# Patient Record
Sex: FEMALE | Race: BLACK OR AFRICAN AMERICAN | NOT HISPANIC OR LATINO | URBAN - METROPOLITAN AREA
[De-identification: names, ages, dates, MRNs, and addresses within clinical notes are randomized per-mention and may not be internally consistent; named-entity substitution may affect disease eponyms.]

---

## 2021-10-23 ENCOUNTER — INPATIENT (INPATIENT)
Facility: HOSPITAL | Age: 56
LOS: 1 days | Discharge: HOME | DRG: 914 | End: 2021-10-25
Attending: NEUROLOGICAL SURGERY | Admitting: NEUROLOGICAL SURGERY
Payer: COMMERCIAL

## 2021-10-23 VITALS
RESPIRATION RATE: 18 BRPM | DIASTOLIC BLOOD PRESSURE: 88 MMHG | WEIGHT: 149.91 LBS | SYSTOLIC BLOOD PRESSURE: 151 MMHG | OXYGEN SATURATION: 98 % | HEART RATE: 99 BPM | TEMPERATURE: 98 F
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
## 2021-10-23 DIAGNOSIS — I82.4Z1 ACUTE EMBOLISM AND THROMBOSIS OF UNSPECIFIED DEEP VEINS OF RIGHT DISTAL LOWER EXTREMITY: ICD-10-CM
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
## 2021-10-23 DIAGNOSIS — S09.90XA UNSPECIFIED INJURY OF HEAD, INITIAL ENCOUNTER: ICD-10-CM
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
## 2021-10-23 DIAGNOSIS — J45.909 UNSPECIFIED ASTHMA, UNCOMPLICATED: ICD-10-CM

## 2021-10-23 DIAGNOSIS — D18.02 HEMANGIOMA OF INTRACRANIAL STRUCTURES: ICD-10-CM

## 2021-10-23 DIAGNOSIS — G93.0 CEREBRAL CYSTS: ICD-10-CM

## 2021-10-23 DIAGNOSIS — S06.2X0A DIFFUSE TRAUMATIC BRAIN INJURY WITHOUT LOSS OF CONSCIOUSNESS, INITIAL ENCOUNTER: ICD-10-CM
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
## 2021-10-23 LAB
ALBUMIN SERPL ELPH-MCNC: 3.7 G/DL — SIGNIFICANT CHANGE UP (ref 3.4–5)
ALP SERPL-CCNC: 89 U/L — SIGNIFICANT CHANGE UP (ref 40–120)
ALT FLD-CCNC: 11 U/L — LOW (ref 12–42)
ANION GAP SERPL CALC-SCNC: 7 MMOL/L — LOW (ref 9–16)
APTT BLD: 26.6 SEC — LOW (ref 27.5–35.5)
AST SERPL-CCNC: 49 U/L — HIGH (ref 15–37)
BILIRUB SERPL-MCNC: 0.7 MG/DL — SIGNIFICANT CHANGE UP (ref 0.2–1.2)
BUN SERPL-MCNC: 12 MG/DL — SIGNIFICANT CHANGE UP (ref 7–23)
CALCIUM SERPL-MCNC: 9.6 MG/DL — SIGNIFICANT CHANGE UP (ref 8.5–10.5)
CHLORIDE SERPL-SCNC: 105 MMOL/L — SIGNIFICANT CHANGE UP (ref 96–108)
CO2 SERPL-SCNC: 26 MMOL/L — SIGNIFICANT CHANGE UP (ref 22–31)
CREAT SERPL-MCNC: 0.68 MG/DL — SIGNIFICANT CHANGE UP (ref 0.5–1.3)
GLUCOSE SERPL-MCNC: 107 MG/DL — HIGH (ref 70–99)
HCT VFR BLD CALC: 40.1 % — SIGNIFICANT CHANGE UP (ref 34.5–45)
HGB BLD-MCNC: 13.2 G/DL — SIGNIFICANT CHANGE UP (ref 11.5–15.5)
INR BLD: 1.05 — SIGNIFICANT CHANGE UP (ref 0.88–1.16)
MCHC RBC-ENTMCNC: 30.7 PG — SIGNIFICANT CHANGE UP (ref 27–34)
MCHC RBC-ENTMCNC: 32.9 GM/DL — SIGNIFICANT CHANGE UP (ref 32–36)
MCV RBC AUTO: 93.3 FL — SIGNIFICANT CHANGE UP (ref 80–100)
NRBC # BLD: 0 /100 WBCS — SIGNIFICANT CHANGE UP (ref 0–0)
PLATELET # BLD AUTO: 243 K/UL — SIGNIFICANT CHANGE UP (ref 150–400)
POTASSIUM SERPL-MCNC: 4.8 MMOL/L — SIGNIFICANT CHANGE UP (ref 3.5–5.3)
POTASSIUM SERPL-SCNC: 4.8 MMOL/L — SIGNIFICANT CHANGE UP (ref 3.5–5.3)
PROT SERPL-MCNC: 7.9 G/DL — SIGNIFICANT CHANGE UP (ref 6.4–8.2)
PROTHROM AB SERPL-ACNC: 12.4 SEC — SIGNIFICANT CHANGE UP (ref 10.6–13.6)
RBC # BLD: 4.3 M/UL — SIGNIFICANT CHANGE UP (ref 3.8–5.2)
RBC # FLD: 13.5 % — SIGNIFICANT CHANGE UP (ref 10.3–14.5)
SARS-COV-2 RNA SPEC QL NAA+PROBE: SIGNIFICANT CHANGE UP
SODIUM SERPL-SCNC: 138 MMOL/L — SIGNIFICANT CHANGE UP (ref 132–145)
WBC # BLD: 5.2 K/UL — SIGNIFICANT CHANGE UP (ref 3.8–10.5)
WBC # FLD AUTO: 5.2 K/UL — SIGNIFICANT CHANGE UP (ref 3.8–10.5)

## 2021-10-23 PROCEDURE — 99285 EMERGENCY DEPT VISIT HI MDM: CPT

## 2021-10-23 PROCEDURE — 70450 CT HEAD/BRAIN W/O DYE: CPT | Mod: 26,77

## 2021-10-23 PROCEDURE — 70450 CT HEAD/BRAIN W/O DYE: CPT | Mod: 26

## 2021-10-23 PROCEDURE — 72125 CT NECK SPINE W/O DYE: CPT | Mod: 26

## 2021-10-23 RX ORDER — TETANUS TOXOID, REDUCED DIPHTHERIA TOXOID AND ACELLULAR PERTUSSIS VACCINE, ADSORBED 5; 2.5; 8; 8; 2.5 [IU]/.5ML; [IU]/.5ML; UG/.5ML; UG/.5ML; UG/.5ML
0.5 SUSPENSION INTRAMUSCULAR ONCE
Refills: 0 | Status: COMPLETED | OUTPATIENT
Start: 2021-10-23 | End: 2021-10-23

## 2021-10-23 RX ORDER — ACETAMINOPHEN 500 MG
650 TABLET ORAL ONCE
Refills: 0 | Status: COMPLETED | OUTPATIENT
Start: 2021-10-23 | End: 2021-10-23

## 2021-10-23 RX ORDER — BACITRACIN ZINC 500 UNIT/G
1 OINTMENT IN PACKET (EA) TOPICAL ONCE
Refills: 0 | Status: COMPLETED | OUTPATIENT
Start: 2021-10-23 | End: 2021-10-23

## 2021-10-23 RX ADMIN — Medication 1 APPLICATION(S): at 16:31

## 2021-10-23 RX ADMIN — TETANUS TOXOID, REDUCED DIPHTHERIA TOXOID AND ACELLULAR PERTUSSIS VACCINE, ADSORBED 0.5 MILLILITER(S): 5; 2.5; 8; 8; 2.5 SUSPENSION INTRAMUSCULAR at 16:31

## 2021-10-23 RX ADMIN — Medication 650 MILLIGRAM(S): at 16:31

## 2021-10-23 NOTE — ED PROVIDER NOTE - ENMT, MLM
Airway patent.  No scalp hematomas noted, but moderate sized hematoma over mid forehead with superficial abrasion.  No periorbital ecchymosis.  No mastoid ecchymosis.

## 2021-10-23 NOTE — ED PROVIDER NOTE - NEUROLOGICAL, MLM
none
Alert and oriented, no focal deficits, no motor or sensory deficits.  CN II-XII grossly intact.

## 2021-10-23 NOTE — ED ADULT NURSE NOTE - NSIMPLEMENTINTERV_GEN_ALL_ED
Implemented All Universal Safety Interventions:  [place] to call system. Call bell, personal items and telephone within reach. Instruct patient to call for assistance. Room bathroom lighting operational. Non-slip footwear when patient is off stretcher. Physically safe environment: no spills, clutter or unnecessary equipment. Stretcher in lowest position, wheels locked, appropriate side rails in place.

## 2021-10-23 NOTE — ED PROVIDER NOTE - OBJECTIVE STATEMENT
She states she tripped on the sidewalk earlier today and fell on her face, striking her forehead on the pavement.  She denies LOC, but she has a mild headache and nausea.  No vomiting.  No neck pain.  No visual complaints.  No other injuries except possibly bumping her right knee.  She is not on blood thinners.  She lives alone and the incident was unwitnessed.  Denies any syncope or seizures.  Otherwise healthy with no significant PMHx.

## 2021-10-23 NOTE — ED ADULT NURSE NOTE - CHIEF COMPLAINT
pt here with mechanical fall from standing height- abrasion noted to forehead- Pt is a+Ox3 denies neck or back pain.

## 2021-10-23 NOTE — ED ADULT TRIAGE NOTE - CHIEF COMPLAINT QUOTE
pt here with mechanical fall from standing height- abrasion noted to forehead- Pt is a+Ox3 denies neck or back pain

## 2021-10-23 NOTE — ED PROVIDER NOTE - CLINICAL SUMMARY MEDICAL DECISION MAKING FREE TEXT BOX
Pt with isolated head injury and no LOC, not on blood thinners.  CT done, which shows two incidental findings (cystic lesions), one of which is causing mass effect, and the other which shows possible surrounding hemorrhage.  Neuro exam is WNL.  Discussed case with [facility]surgery who agreed with the need for admission, repeat 4-hr CT, and MRI evaluation.  Patient is agreeable to plan and consents to transfer.

## 2021-10-24 DIAGNOSIS — G93.9 DISORDER OF BRAIN, UNSPECIFIED: ICD-10-CM

## 2021-10-24 DIAGNOSIS — Z98.890 OTHER SPECIFIED POSTPROCEDURAL STATES: Chronic | ICD-10-CM

## 2021-10-24 DIAGNOSIS — J45.909 UNSPECIFIED ASTHMA, UNCOMPLICATED: ICD-10-CM

## 2021-10-24 DIAGNOSIS — Z01.818 ENCOUNTER FOR OTHER PREPROCEDURAL EXAMINATION: ICD-10-CM

## 2021-10-24 LAB
ALBUMIN SERPL ELPH-MCNC: 4.3 G/DL — SIGNIFICANT CHANGE UP (ref 3.3–5)
ALP SERPL-CCNC: 80 U/L — SIGNIFICANT CHANGE UP (ref 40–120)
ALT FLD-CCNC: 19 U/L — SIGNIFICANT CHANGE UP (ref 10–45)
ANION GAP SERPL CALC-SCNC: 8 MMOL/L — SIGNIFICANT CHANGE UP (ref 5–17)
AST SERPL-CCNC: 26 U/L — SIGNIFICANT CHANGE UP (ref 10–40)
BILIRUB SERPL-MCNC: 0.9 MG/DL — SIGNIFICANT CHANGE UP (ref 0.2–1.2)
BLD GP AB SCN SERPL QL: NEGATIVE — SIGNIFICANT CHANGE UP
BUN SERPL-MCNC: 9 MG/DL — SIGNIFICANT CHANGE UP (ref 7–23)
CALCIUM SERPL-MCNC: 9.3 MG/DL — SIGNIFICANT CHANGE UP (ref 8.4–10.5)
CHLORIDE SERPL-SCNC: 106 MMOL/L — SIGNIFICANT CHANGE UP (ref 96–108)
CO2 SERPL-SCNC: 28 MMOL/L — SIGNIFICANT CHANGE UP (ref 22–31)
CREAT SERPL-MCNC: 0.73 MG/DL — SIGNIFICANT CHANGE UP (ref 0.5–1.3)
GLUCOSE SERPL-MCNC: 96 MG/DL — SIGNIFICANT CHANGE UP (ref 70–99)
HCG SERPL-ACNC: 2 MIU/ML — SIGNIFICANT CHANGE UP
HCT VFR BLD CALC: 38.2 % — SIGNIFICANT CHANGE UP (ref 34.5–45)
HGB BLD-MCNC: 12.6 G/DL — SIGNIFICANT CHANGE UP (ref 11.5–15.5)
MAGNESIUM SERPL-MCNC: 2 MG/DL — SIGNIFICANT CHANGE UP (ref 1.6–2.6)
MCHC RBC-ENTMCNC: 30.4 PG — SIGNIFICANT CHANGE UP (ref 27–34)
MCHC RBC-ENTMCNC: 33 GM/DL — SIGNIFICANT CHANGE UP (ref 32–36)
MCV RBC AUTO: 92.3 FL — SIGNIFICANT CHANGE UP (ref 80–100)
NRBC # BLD: 0 /100 WBCS — SIGNIFICANT CHANGE UP (ref 0–0)
PHOSPHATE SERPL-MCNC: 3.7 MG/DL — SIGNIFICANT CHANGE UP (ref 2.5–4.5)
PLATELET # BLD AUTO: 251 K/UL — SIGNIFICANT CHANGE UP (ref 150–400)
POTASSIUM SERPL-MCNC: 3.5 MMOL/L — SIGNIFICANT CHANGE UP (ref 3.5–5.3)
POTASSIUM SERPL-SCNC: 3.5 MMOL/L — SIGNIFICANT CHANGE UP (ref 3.5–5.3)
PROT SERPL-MCNC: 7.2 G/DL — SIGNIFICANT CHANGE UP (ref 6–8.3)
RBC # BLD: 4.14 M/UL — SIGNIFICANT CHANGE UP (ref 3.8–5.2)
RBC # FLD: 13.3 % — SIGNIFICANT CHANGE UP (ref 10.3–14.5)
RH IG SCN BLD-IMP: POSITIVE — SIGNIFICANT CHANGE UP
SODIUM SERPL-SCNC: 142 MMOL/L — SIGNIFICANT CHANGE UP (ref 135–145)
WBC # BLD: 4.65 K/UL — SIGNIFICANT CHANGE UP (ref 3.8–10.5)
WBC # FLD AUTO: 4.65 K/UL — SIGNIFICANT CHANGE UP (ref 3.8–10.5)

## 2021-10-24 PROCEDURE — 99222 1ST HOSP IP/OBS MODERATE 55: CPT | Mod: GC

## 2021-10-24 PROCEDURE — 70553 MRI BRAIN STEM W/O & W/DYE: CPT | Mod: 26

## 2021-10-24 RX ORDER — ALBUTEROL 90 UG/1
2.5 AEROSOL, METERED ORAL EVERY 6 HOURS
Refills: 0 | Status: DISCONTINUED | OUTPATIENT
Start: 2021-10-24 | End: 2021-10-25

## 2021-10-24 RX ORDER — SENNA PLUS 8.6 MG/1
2 TABLET ORAL AT BEDTIME
Refills: 0 | Status: DISCONTINUED | OUTPATIENT
Start: 2021-10-24 | End: 2021-10-25

## 2021-10-24 RX ORDER — ALBUTEROL 90 UG/1
0 AEROSOL, METERED ORAL
Qty: 0 | Refills: 0 | DISCHARGE

## 2021-10-24 RX ORDER — INFLUENZA VIRUS VACCINE 15; 15; 15; 15 UG/.5ML; UG/.5ML; UG/.5ML; UG/.5ML
0.5 SUSPENSION INTRAMUSCULAR ONCE
Refills: 0 | Status: DISCONTINUED | OUTPATIENT
Start: 2021-10-24 | End: 2021-10-25

## 2021-10-24 RX ORDER — ONDANSETRON 8 MG/1
4 TABLET, FILM COATED ORAL EVERY 6 HOURS
Refills: 0 | Status: DISCONTINUED | OUTPATIENT
Start: 2021-10-24 | End: 2021-10-25

## 2021-10-24 RX ORDER — ACETAMINOPHEN 500 MG
650 TABLET ORAL EVERY 6 HOURS
Refills: 0 | Status: DISCONTINUED | OUTPATIENT
Start: 2021-10-24 | End: 2021-10-25

## 2021-10-24 RX ORDER — POTASSIUM CHLORIDE 20 MEQ
20 PACKET (EA) ORAL
Refills: 0 | Status: COMPLETED | OUTPATIENT
Start: 2021-10-24 | End: 2021-10-24

## 2021-10-24 RX ADMIN — Medication 20 MILLIEQUIVALENT(S): at 13:20

## 2021-10-24 RX ADMIN — Medication 20 MILLIEQUIVALENT(S): at 11:06

## 2021-10-24 NOTE — H&P ADULT - NSHPLABSRESULTS_GEN_ALL_CORE
.  LABS:                         13.2   5.20  )-----------( 243      ( 23 Oct 2021 19:51 )             40.1     10-23    138  |  105  |  12  ----------------------------<  107<H>  4.8   |  26  |  0.68    Ca    9.6      23 Oct 2021 19:51    TPro  7.9  /  Alb  3.7  /  TBili  0.7  /  DBili  x   /  AST  49<H>  /  ALT  11<L>  /  AlkPhos  89  10-23    PT/INR - ( 23 Oct 2021 19:51 )   PT: 12.4 sec;   INR: 1.05          PTT - ( 23 Oct 2021 19:51 )  PTT:26.6 sec          RADIOLOGY, EKG & ADDITIONAL TESTS: Reviewed.  < from: CT Head No Cont (10.23.21 @ 16:45) >    IMPRESSION:    Right frontal lobe cystic lesion measuring 4.1 x 2.4 cm with associated mass effect, nonspecific finding, which may represent neuroepithelial cyst or large perivascular space, among other etiology. Dedicated MRI brain with and without contrast may be obtained for further assessment.    Left frontal lobe subcentimeter cyst with surrounding crescentic hyperdensity, possibilityof hemorrhage in the setting of acute trauma cannot be excluded. Short-term follow-up imaging is recommended.    < end of copied text >    Stability CT head 10/23: stable lesions and area of hyperdensity  CT cervical spine 10/23: wet read negative for fracture or areas of instability

## 2021-10-24 NOTE — PROGRESS NOTE ADULT - ASSESSMENT
57 y/o F with pmh asthma transferred from [facility] s/p mechanical trip and fall found to have large right frontal cystic brain lesion and small left frontal cystic brain lesion with small area of surrounding hyperdensity suspicious for traumatic hemorrhage.

## 2021-10-24 NOTE — H&P ADULT - ASSESSMENT
55 y/o F with pmh asthma transferred from [facility] s/p mechanical trip and fall found to have large right frontal cystic brain lesion and small left frontal cystic brain lesion with small area of surrounding hyperdensity suspicious for traumatic hemorrhage.    Neuro:  -neuro/vital checks q4  -pain control  -repeat CT 10/23 stable  -CT c-spine 10/23, f/u final read  -MRI brain w/ w/o contrast pending    Cardiac:  -normotensive BP goal  -pending baseline EKG    Pulmonary:  -albuterol nebulizer prn  -on RA    GI:  -regular diet  -bowel regimen    Renal:  - replete electolytes prn    Heme:  - SCDs for DVT ppx  - pending baseline LE dopplers    Endo:  - no issues    ID:  - afebrile    D/w Dr. Lazaro

## 2021-10-24 NOTE — H&P ADULT - NSHPPHYSICALEXAM_GEN_ALL_CORE
General: patient seen laying supine in bed in NAD  Neuro: AAOx3, FC, OE spontaneously, speech clear and fluent, CNII-XI grossly intact, face symmetric, no pronator drift, finger to nose intact, strength 5/5 b/l UE and LE, sensation intact to light touch throughout  HEENT: PERRL, EOMI, VFs intact b/l  Neck: supple  Cardiac: RRR, S1S2  Pulmonary: chest rise symmetric  Abdomen: soft, nontender, nondistended  Ext: warm, perfusing well  Skin: abrasion to forehead

## 2021-10-24 NOTE — CHART NOTE - NSCHARTNOTEFT_GEN_A_CORE
Neurosurgical Indications for Screening Dopplers on Admission:       1) Known hypercoagulation disorder (h/o VTE, thrombophilia, HIT, etc.)   2) Admitted from prolonged stay from another institution (straight forward ER transfers not included)  3) Presenting with significant leg immobility   4) Presenting with signs and symptoms of VTE?    5) With significant critical illness, Including "found down" for unknown period of time in HPI  6) With significant neurotrauma (TBI, SCI / TLS spine fractures)   7) Who are comatose   8) With known malignancy (e.g. glioblastoma multiforme, meningioma, etc.). Excludes IA chemo 23hr observation stays - cystic brain lesions  9) On hemodialysis   10) Who have received platelet transfusion or antithrombotic reversal agents recently   11) Who have had recent major orthopedic surgery          Screening dopplers indicated?   Y x  N _    DVT Prophylaxis:  x SCD's   _ chemoprophylaxis

## 2021-10-24 NOTE — H&P ADULT - HISTORY OF PRESENT ILLNESS
55 y/o F with pmh asthma transferred from [facility] s/p mechanical fall found to have large right frontal cystic brain lesion and small left frontal cystic brain lesion with small area of surrounding hyperdensity suspicious for traumatic hemorrhage. Patient states that she was walking and tripped and fell on an uneven sidewalk, hitting her forehead on the pavement. Patient reports that she was not feeling dizzy or off balance prior to the fall. Patient was brought via ambulance to [facility] where CTH showed cystic brain lesions and small area of hyperdensity suspicious for hemorrhage. Stability CT 5 hours later was stable. Patient was transferred to [facility] for further workup. Patient admits to mild headache and soreness over her forehead where she hit the ground when she fell.  Patient denies LOC, history of brain lesions or hemorrhage, dizziness, confusion, diplopia, blurry vision, gait instability, and extremity numbness or weakness.

## 2021-10-24 NOTE — PROGRESS NOTE ADULT - SUBJECTIVE AND OBJECTIVE BOX
Patient is a 56y old  Female who presents with a chief complaint of cystic brain lesion (24 Oct 2021 00:38)      HPI:  55 y/o F with pmh asthma transferred from [facility] s/p mechanical fall found to have large right frontal cystic brain lesion and small left frontal cystic brain lesion with small area of surrounding hyperdensity suspicious for traumatic hemorrhage. Patient states that she was walking and tripped and fell on an uneven sidewalk, hitting her forehead on the pavement. Patient reports that she was not feeling dizzy or off balance prior to the fall. Patient was brought via ambulance to [facility] where CTH showed cystic brain lesions and small area of hyperdensity suspicious for hemorrhage. Stability CT 5 hours later was stable. Patient was transferred to [facility] for further workup. Patient admits to mild headache and soreness over her forehead where she hit the ground when she fell.  Patient denies LOC, history of brain lesions or hemorrhage, dizziness, confusion, diplopia, blurry vision, gait instability, and extremity numbness or weakness. (24 Oct 2021 00:38)    Subjective:      Allergies    No Known Allergies    Intolerances    Home meds:     MEDICATIONS  (STANDING):  influenza   Vaccine 0.5 milliLiter(s) IntraMuscular once    MEDICATIONS  (PRN):  acetaminophen     Tablet .. 650 milliGRAM(s) Oral every 6 hours PRN Temp greater or equal to 38.5C (101.3F), Mild Pain (1 - 3)  ALBUTerol    0.083% 2.5 milliGRAM(s) Nebulizer every 6 hours PRN Shortness of Breath and/or Wheezing  ondansetron Injectable 4 milliGRAM(s) IV Push every 6 hours PRN Nausea and/or Vomiting  senna 2 Tablet(s) Oral at bedtime PRN Constipation      Drug Dosing Weight    Weight (kg): 68 (23 Oct 2021 15:26)    PAST MEDICAL & SURGICAL HISTORY:  Asthma    History of pterygium excision  b/l eyes        FAMILY HISTORY:  No pertinent family history in first degree relatives of cardiac disease        SOCIAL HISTORY:    ADVANCE DIRECTIVES:    Vital Signs Last 24 Hrs  T(C): 36.4 (24 Oct 2021 05:20), Max: 36.9 (23 Oct 2021 23:15)  T(F): 97.5 (24 Oct 2021 05:20), Max: 98.5 (23 Oct 2021 23:15)  HR: 71 (24 Oct 2021 05:20) (71 - 99)  BP: 104/64 (24 Oct 2021 05:20) (104/64 - 151/88)  BP(mean): --  ABP: --  ABP(mean): --  RR: 16 (24 Oct 2021 05:20) (16 - 18)  SpO2: 100% (24 Oct 2021 05:20) (97% - 100%)          I&O's Detail    23 Oct 2021 07:01  -  24 Oct 2021 07:00  --------------------------------------------------------  IN:  Total IN: 0 mL    OUT:    Voided (mL): 300 mL  Total OUT: 300 mL    Total NET: -300 mL          PHYSICAL EXAM:      Constitutional:    Eyes:    ENMT:    Neck:    Breasts:    Back:    Respiratory:    Cardiovascular:    Gastrointestinal:    Genitourinary:    Rectal:    Extremities:    Vascular:    Neurological:    Skin:    Lymph Nodes:    Musculoskeletal:    Psychiatric:        LABS:  CBC Full  -  ( 24 Oct 2021 07:15 )  WBC Count : 4.65 K/uL  RBC Count : 4.14 M/uL  Hemoglobin : 12.6 g/dL  Hematocrit : 38.2 %  Platelet Count - Automated : 251 K/uL  Mean Cell Volume : 92.3 fl  Mean Cell Hemoglobin : 30.4 pg  Mean Cell Hemoglobin Concentration : 33.0 gm/dL  Auto Neutrophil # : x  Auto Lymphocyte # : x  Auto Monocyte # : x  Auto Eosinophil # : x  Auto Basophil # : x  Auto Neutrophil % : x  Auto Lymphocyte % : x  Auto Monocyte % : x  Auto Eosinophil % : x  Auto Basophil % : x    10-24    142  |  106  |  9   ----------------------------<  96  3.5   |  28  |  0.73    Ca    9.3      24 Oct 2021 07:15  Phos  3.7     10-24  Mg     2.0     10-24    TPro  7.2  /  Alb  4.3  /  TBili  0.9  /  DBili  x   /  AST  26  /  ALT  19  /  AlkPhos  80  10-24    CAPILLARY BLOOD GLUCOSE        PT/INR - ( 23 Oct 2021 19:51 )   PT: 12.4 sec;   INR: 1.05          PTT - ( 23 Oct 2021 19:51 )  PTT:26.6 sec      EKG:    ECHO, US:    RADIOLOGY:  < from: CT Cervical Spine No Cont (10.23.21 @ 21:44) >  IMPRESSION:  No acute fracture or malalignment.    < end of copied text >    < from: CT Head No Cont (10.23.21 @ 21:44) >  IMPRESSION:  No significant interval change. No new acute intracranial hemorrhage.    < end of copied text >  < from: CT Head No Cont (10.23.21 @ 16:45) >  IMPRESSION:    Right frontal lobe cystic lesion measuring 4.1 x 2.4 cm with associated mass effect, nonspecific finding, which may represent neuroepithelial cyst or large perivascular space, among other etiology. Dedicated MRI brain with and without contrast may be obtained for further assessment.    Left frontal lobe subcentimeter cyst with surrounding crescentic hyperdensity, possibilityof     < end of copied text >     Patient is a 56y old  Female who presents with a chief complaint of cystic brain lesion (24 Oct 2021 00:38)      HPI:  57 y/o F with pmh asthma transferred from [facility] s/p mechanical fall found to have large right frontal cystic brain lesion and small left frontal cystic brain lesion with small area of surrounding hyperdensity suspicious for traumatic hemorrhage. Patient states that she was walking and tripped and fell on an uneven sidewalk, hitting her forehead on the pavement. Patient reports that she was not feeling dizzy or off balance prior to the fall. Patient was brought via ambulance to [facility] where CTH showed cystic brain lesions and small area of hyperdensity suspicious for hemorrhage. Stability CT 5 hours later was stable. Patient was transferred to [facility] for further workup. Patient admits to mild headache and soreness over her forehead where she hit the ground when she fell.  Patient denies LOC, history of brain lesions or hemorrhage, dizziness, confusion, diplopia, blurry vision, gait instability, and extremity numbness or weakness. (24 Oct 2021 00:38)    Subjective:  Patient has no acute complaints. Denies pain, SOB, CP. ROS is otherwise negative.     Allergies    No Known Allergies    Intolerances    Home meds: None    MEDICATIONS  (STANDING):  influenza   Vaccine 0.5 milliLiter(s) IntraMuscular once    MEDICATIONS  (PRN):  acetaminophen     Tablet .. 650 milliGRAM(s) Oral every 6 hours PRN Temp greater or equal to 38.5C (101.3F), Mild Pain (1 - 3)  ALBUTerol    0.083% 2.5 milliGRAM(s) Nebulizer every 6 hours PRN Shortness of Breath and/or Wheezing  ondansetron Injectable 4 milliGRAM(s) IV Push every 6 hours PRN Nausea and/or Vomiting  senna 2 Tablet(s) Oral at bedtime PRN Constipation      Drug Dosing Weight    Weight (kg): 68 (23 Oct 2021 15:26)    PAST MEDICAL & SURGICAL HISTORY:  Asthma    History of pterygium excision  b/l eyes        FAMILY HISTORY:  No pertinent family history in first degree relatives of cardiac disease        SOCIAL HISTORY: no smoking    ADVANCE DIRECTIVES:    Vital Signs Last 24 Hrs  T(C): 36.4 (24 Oct 2021 05:20), Max: 36.9 (23 Oct 2021 23:15)  T(F): 97.5 (24 Oct 2021 05:20), Max: 98.5 (23 Oct 2021 23:15)  HR: 71 (24 Oct 2021 05:20) (71 - 99)  BP: 104/64 (24 Oct 2021 05:20) (104/64 - 151/88)  BP(mean): --  ABP: --  ABP(mean): --  RR: 16 (24 Oct 2021 05:20) (16 - 18)  SpO2: 100% (24 Oct 2021 05:20) (97% - 100%)          I&O's Detail    23 Oct 2021 07:01  -  24 Oct 2021 07:00  --------------------------------------------------------  IN:  Total IN: 0 mL    OUT:    Voided (mL): 300 mL  Total OUT: 300 mL    Total NET: -300 mL          PHYSICAL EXAM:      Constitutional: NAD  Eyes: PERRLA  ENMT: MMM  Neck: supple  Back: midline  Respiratory: CTA b/l  Cardiovascular: rrr, s1s2, no m/r/g  Gastrointestinal: soft, NTND, + BS  Extremities: wwp  Vascular: + 2 pulses radial  Neurological: AAO x 4  Skin: no rash, small laceration on forehead  Lymph Nodes: no LAD  Musculoskeletal: no joint swelling  Psychiatric: normal affect        LABS:  CBC Full  -  ( 24 Oct 2021 07:15 )  WBC Count : 4.65 K/uL  RBC Count : 4.14 M/uL  Hemoglobin : 12.6 g/dL  Hematocrit : 38.2 %  Platelet Count - Automated : 251 K/uL  Mean Cell Volume : 92.3 fl  Mean Cell Hemoglobin : 30.4 pg  Mean Cell Hemoglobin Concentration : 33.0 gm/dL  Auto Neutrophil # : x  Auto Lymphocyte # : x  Auto Monocyte # : x  Auto Eosinophil # : x  Auto Basophil # : x  Auto Neutrophil % : x  Auto Lymphocyte % : x  Auto Monocyte % : x  Auto Eosinophil % : x  Auto Basophil % : x    10-24    142  |  106  |  9   ----------------------------<  96  3.5   |  28  |  0.73    Ca    9.3      24 Oct 2021 07:15  Phos  3.7     10-24  Mg     2.0     10-24    TPro  7.2  /  Alb  4.3  /  TBili  0.9  /  DBili  x   /  AST  26  /  ALT  19  /  AlkPhos  80  10-24    CAPILLARY BLOOD GLUCOSE        PT/INR - ( 23 Oct 2021 19:51 )   PT: 12.4 sec;   INR: 1.05          PTT - ( 23 Oct 2021 19:51 )  PTT:26.6 sec      EKG:    ECHO, US:    RADIOLOGY:  < from: CT Cervical Spine No Cont (10.23.21 @ 21:44) >  IMPRESSION:  No acute fracture or malalignment.    < end of copied text >    < from: CT Head No Cont (10.23.21 @ 21:44) >  IMPRESSION:  No significant interval change. No new acute intracranial hemorrhage.    < end of copied text >  < from: CT Head No Cont (10.23.21 @ 16:45) >  IMPRESSION:    Right frontal lobe cystic lesion measuring 4.1 x 2.4 cm with associated mass effect, nonspecific finding, which may represent neuroepithelial cyst or large perivascular space, among other etiology. Dedicated MRI brain with and without contrast may be obtained for further assessment.    Left frontal lobe subcentimeter cyst with surrounding crescentic hyperdensity, possibilityof     < end of copied text >

## 2021-10-24 NOTE — PROGRESS NOTE ADULT - PROBLEM SELECTOR PLAN 3
Dickerson score < 1%, METS > 4 (goes to the gym 4 - 5 times/ week)  -Pt can proceed to OR without further cardiac testing

## 2021-10-25 ENCOUNTER — TRANSCRIPTION ENCOUNTER (OUTPATIENT)
Age: 56
End: 2021-10-25

## 2021-10-25 VITALS — HEART RATE: 78 BPM | SYSTOLIC BLOOD PRESSURE: 104 MMHG | OXYGEN SATURATION: 100 % | DIASTOLIC BLOOD PRESSURE: 69 MMHG

## 2021-10-25 LAB
ANION GAP SERPL CALC-SCNC: 12 MMOL/L — SIGNIFICANT CHANGE UP (ref 5–17)
BUN SERPL-MCNC: 11 MG/DL — SIGNIFICANT CHANGE UP (ref 7–23)
CALCIUM SERPL-MCNC: 9.4 MG/DL — SIGNIFICANT CHANGE UP (ref 8.4–10.5)
CHLORIDE SERPL-SCNC: 106 MMOL/L — SIGNIFICANT CHANGE UP (ref 96–108)
CO2 SERPL-SCNC: 23 MMOL/L — SIGNIFICANT CHANGE UP (ref 22–31)
CREAT SERPL-MCNC: 0.74 MG/DL — SIGNIFICANT CHANGE UP (ref 0.5–1.3)
GLUCOSE SERPL-MCNC: 91 MG/DL — SIGNIFICANT CHANGE UP (ref 70–99)
HCT VFR BLD CALC: 41.7 % — SIGNIFICANT CHANGE UP (ref 34.5–45)
HGB BLD-MCNC: 13.6 G/DL — SIGNIFICANT CHANGE UP (ref 11.5–15.5)
MAGNESIUM SERPL-MCNC: 1.9 MG/DL — SIGNIFICANT CHANGE UP (ref 1.6–2.6)
MCHC RBC-ENTMCNC: 31.3 PG — SIGNIFICANT CHANGE UP (ref 27–34)
MCHC RBC-ENTMCNC: 32.6 GM/DL — SIGNIFICANT CHANGE UP (ref 32–36)
MCV RBC AUTO: 96.1 FL — SIGNIFICANT CHANGE UP (ref 80–100)
NRBC # BLD: 0 /100 WBCS — SIGNIFICANT CHANGE UP (ref 0–0)
PHOSPHATE SERPL-MCNC: 3.6 MG/DL — SIGNIFICANT CHANGE UP (ref 2.5–4.5)
PLATELET # BLD AUTO: 215 K/UL — SIGNIFICANT CHANGE UP (ref 150–400)
POTASSIUM SERPL-MCNC: 3.7 MMOL/L — SIGNIFICANT CHANGE UP (ref 3.5–5.3)
POTASSIUM SERPL-SCNC: 3.7 MMOL/L — SIGNIFICANT CHANGE UP (ref 3.5–5.3)
RBC # BLD: 4.34 M/UL — SIGNIFICANT CHANGE UP (ref 3.8–5.2)
RBC # FLD: 13.4 % — SIGNIFICANT CHANGE UP (ref 10.3–14.5)
SODIUM SERPL-SCNC: 141 MMOL/L — SIGNIFICANT CHANGE UP (ref 135–145)
WBC # BLD: 6.08 K/UL — SIGNIFICANT CHANGE UP (ref 3.8–10.5)
WBC # FLD AUTO: 6.08 K/UL — SIGNIFICANT CHANGE UP (ref 3.8–10.5)

## 2021-10-25 PROCEDURE — 80053 COMPREHEN METABOLIC PANEL: CPT

## 2021-10-25 PROCEDURE — 72125 CT NECK SPINE W/O DYE: CPT

## 2021-10-25 PROCEDURE — 70450 CT HEAD/BRAIN W/O DYE: CPT

## 2021-10-25 PROCEDURE — 70553 MRI BRAIN STEM W/O & W/DYE: CPT

## 2021-10-25 PROCEDURE — 84100 ASSAY OF PHOSPHORUS: CPT

## 2021-10-25 PROCEDURE — 93970 EXTREMITY STUDY: CPT | Mod: 26

## 2021-10-25 PROCEDURE — 86900 BLOOD TYPING SEROLOGIC ABO: CPT

## 2021-10-25 PROCEDURE — 86850 RBC ANTIBODY SCREEN: CPT

## 2021-10-25 PROCEDURE — 85730 THROMBOPLASTIN TIME PARTIAL: CPT

## 2021-10-25 PROCEDURE — 85027 COMPLETE CBC AUTOMATED: CPT

## 2021-10-25 PROCEDURE — 36415 COLL VENOUS BLD VENIPUNCTURE: CPT

## 2021-10-25 PROCEDURE — 99233 SBSQ HOSP IP/OBS HIGH 50: CPT

## 2021-10-25 PROCEDURE — 80048 BASIC METABOLIC PNL TOTAL CA: CPT

## 2021-10-25 PROCEDURE — 90471 IMMUNIZATION ADMIN: CPT

## 2021-10-25 PROCEDURE — 84702 CHORIONIC GONADOTROPIN TEST: CPT

## 2021-10-25 PROCEDURE — A9585: CPT

## 2021-10-25 PROCEDURE — 85610 PROTHROMBIN TIME: CPT

## 2021-10-25 PROCEDURE — 86901 BLOOD TYPING SEROLOGIC RH(D): CPT

## 2021-10-25 PROCEDURE — 93970 EXTREMITY STUDY: CPT

## 2021-10-25 PROCEDURE — 97161 PT EVAL LOW COMPLEX 20 MIN: CPT

## 2021-10-25 PROCEDURE — 90715 TDAP VACCINE 7 YRS/> IM: CPT

## 2021-10-25 PROCEDURE — 99285 EMERGENCY DEPT VISIT HI MDM: CPT | Mod: 25

## 2021-10-25 PROCEDURE — 87635 SARS-COV-2 COVID-19 AMP PRB: CPT

## 2021-10-25 PROCEDURE — 83735 ASSAY OF MAGNESIUM: CPT

## 2021-10-25 RX ORDER — ACETAMINOPHEN 500 MG
2 TABLET ORAL
Qty: 0 | Refills: 0 | DISCHARGE
Start: 2021-10-25

## 2021-10-25 RX ORDER — POTASSIUM CHLORIDE 20 MEQ
40 PACKET (EA) ORAL ONCE
Refills: 0 | Status: COMPLETED | OUTPATIENT
Start: 2021-10-25 | End: 2021-10-25

## 2021-10-25 RX ADMIN — Medication 40 MILLIEQUIVALENT(S): at 08:33

## 2021-10-25 NOTE — PHYSICAL THERAPY INITIAL EVALUATION ADULT - THERAPY FREQUENCY, PT EVAL
Patient educated on discharge from of inpatient physical therapy at [facility], patient verbalized understanding.

## 2021-10-25 NOTE — DISCHARGE NOTE PROVIDER - NSDCCPCAREPLAN_GEN_ALL_CORE_FT
PRINCIPAL DISCHARGE DIAGNOSIS  Diagnosis: Head injury  Assessment and Plan of Treatment:       SECONDARY DISCHARGE DIAGNOSES  Diagnosis: Asthma  Assessment and Plan of Treatment:     Diagnosis: Lesion of right frontal lobe of brain  Assessment and Plan of Treatment:      PRINCIPAL DISCHARGE DIAGNOSIS  Diagnosis: Head injury  Assessment and Plan of Treatment:       SECONDARY DISCHARGE DIAGNOSES  Diagnosis: Asthma  Assessment and Plan of Treatment:     Diagnosis: Lesion of right frontal lobe of brain  Assessment and Plan of Treatment:     Diagnosis: DVT, lower extremity  Assessment and Plan of Treatment: R intramuscular     PRINCIPAL DISCHARGE DIAGNOSIS  Diagnosis: Head injury  Assessment and Plan of Treatment:       SECONDARY DISCHARGE DIAGNOSES  Diagnosis: Asthma  Assessment and Plan of Treatment:     Diagnosis: Lesion of right frontal lobe of brain  Assessment and Plan of Treatment: incidental finding. no plan for surgery this admission, repeat MRI in 3m onths    Diagnosis: DVT, lower extremity  Assessment and Plan of Treatment: R intramuscular    Diagnosis: Cavernoma  Assessment and Plan of Treatment: left intrcranial. incidental finding, no plan for surgery this admission, repeat MRI in 3 months

## 2021-10-25 NOTE — DISCHARGE NOTE PROVIDER - NSDCFUADDINST_GEN_ALL_CORE_FT
Neurosurgery follow up:  - please call the office to confirm appointment: [phone]     Please also follow up with your primary care doctor.     Medications:  - continue home meds as prescribed   - pain meds: Tylenol as needed for pain  - pain medications can cause constipation, you should eat a high fiber diet and may take a stool softener while on pain meds   - Avoid taking Advil (ibuprofen), Motrin (naproxen), or Aspirin for pain as they can cause bleeding     Call the office or come to ED if:  - wound has drainage or bleeding, increased redness or pain at incision site, neurological change, fever (>101), chills, night sweats, syncope, nausea/vomiting      Playback:  - please see [facility] for a copy of your discharge paperwork     WITHIN 24 HOURS OF DISCHARGE, PLEASE CONTACT NEURO PA  WITH ANY QUESTIONS OR CONCERNS: [phone]   OTHERWISE, PLEASE CALL THE OFFICE WITH ANY QUESTIONS OR CONCERNS: [phone] Neurosurgery follow up: 10/29/21 at 11AM  - please call the office to confirm appointment: [phone]     Please also follow up with your primary care doctor.     Medications:  - continue home meds as prescribed   - pain meds: Tylenol as needed for pain  - pain medications can cause constipation, you should eat a high fiber diet and may take a stool softener while on pain meds   - Avoid taking Advil (ibuprofen), Motrin (naproxen), or Aspirin for pain as they can cause bleeding     Call the office or come to ED if:  - wound has drainage or bleeding, increased redness or pain at incision site, neurological change, fever (>101), chills, night sweats, syncope, nausea/vomiting      Playback:  - please see [employer] for a copy of your discharge paperwork     WITHIN 24 HOURS OF DISCHARGE, PLEASE CONTACT NEURO PA  WITH ANY QUESTIONS OR CONCERNS: [phone]   OTHERWISE, PLEASE CALL THE OFFICE WITH ANY QUESTIONS OR CONCERNS: [phone] Neurosurgery follow up: 10/29/21 at 11AM  - please call the office to confirm appointment: [phone]     Please also follow up with your primary care doctor.   - IM DVT f/u?     Medications:  - continue home meds as prescribed   - pain meds: Tylenol as needed for pain  - pain medications can cause constipation, you should eat a high fiber diet and may take a stool softener while on pain meds   - Avoid taking Advil (ibuprofen), Motrin (naproxen), or Aspirin for pain as they can cause bleeding     Call the office or come to ED if:  - wound has drainage or bleeding, increased redness or pain at incision site, neurological change, fever (>101), chills, night sweats, syncope, nausea/vomiting      Playback:  - please see [employer] for a copy of your discharge paperwork     WITHIN 24 HOURS OF DISCHARGE, PLEASE CONTACT NEURO PA  WITH ANY QUESTIONS OR CONCERNS: [phone]   OTHERWISE, PLEASE CALL THE OFFICE WITH ANY QUESTIONS OR CONCERNS: [phone] Neurosurgery follow up: 10/29/21 at 11AM  - please call the office to confirm appointment: [phone]     Please also follow up with your primary care doctor. You will need a repeat lower extremity ultrasound in 1 week to monitor your right intramuscular DVT that was found on admission doppler. There is no plan for starting anticoagulation at this time. Your primary care doctor may choose to start it after your repeat ultrasound.     Medications:  - continue home meds as prescribed   - pain meds: Tylenol as needed for pain  - pain medications can cause constipation, you should eat a high fiber diet and may take a stool softener while on pain meds   - Avoid taking Advil (ibuprofen), Motrin (naproxen), or Aspirin for pain as they can cause bleeding     Call the office or come to ED if:  - wound has drainage or bleeding, increased redness or pain at incision site, neurological change, fever (>101), chills, night sweats, syncope, nausea/vomiting      Playback:  - please see [employer] for a copy of your discharge paperwork     WITHIN 24 HOURS OF DISCHARGE, PLEASE CONTACT NEURO PA  WITH ANY QUESTIONS OR CONCERNS: [phone]   OTHERWISE, PLEASE CALL THE OFFICE WITH ANY QUESTIONS OR CONCERNS: [phone] Neurosurgery follow up: 10/29/21 at 11AM  - please call the office to confirm appointment: [phone]   - you will need a repeat MRI in 3 months    Please also follow up with your primary care doctor. You will need a repeat lower extremity ultrasound in 1 week to monitor your right intramuscular DVT that was found on admission doppler. There is no plan for starting anticoagulation at this time. Your primary care doctor may choose to start it after your repeat ultrasound.     Medications:  - continue home meds as prescribed   - pain meds: Tylenol as needed for pain  - pain medications can cause constipation, you should eat a high fiber diet and may take a stool softener while on pain meds   - Avoid taking Advil (ibuprofen), Motrin (naproxen), or Aspirin for pain as they can cause bleeding     Call the office or come to ED if:  - wound has drainage or bleeding, increased redness or pain at incision site, neurological change, fever (>101), chills, night sweats, syncope, nausea/vomiting      Playback:  - please see [employer] for a copy of your discharge paperwork     WITHIN 24 HOURS OF DISCHARGE, PLEASE CONTACT NEURO PA  WITH ANY QUESTIONS OR CONCERNS: [phone]   OTHERWISE, PLEASE CALL THE OFFICE WITH ANY QUESTIONS OR CONCERNS: [phone]

## 2021-10-25 NOTE — PHYSICAL THERAPY INITIAL EVALUATION ADULT - GENERAL OBSERVATIONS, REHAB EVAL
PT IE completed. Chart reviewed. Patient without complaints of pain at rest, agreeable to PT. Patient received semi-supine, NAD, +frontal cranial laceration C/D/I, +IV hep lock, ANT Reynoso (nsx) cleared patient for treatment.

## 2021-10-25 NOTE — OCCUPATIONAL THERAPY INITIAL EVALUATION ADULT - SENSORY TESTS
Visual fields are full to confrontation, H and Quad test intact, Eye movements are intact without nystagmus, Pupils equally round and reactive to light, facial sensation V1-V3 equal and intact, face is symmetric with normal eye closure and smile, tongue protrudes midlines, shoulder shrugs intact, puffing cheeks intact, b/l Eyebrow shrugs intact, hearing bilaterally with rubs intact

## 2021-10-25 NOTE — OCCUPATIONAL THERAPY INITIAL EVALUATION ADULT - ADDITIONAL COMMENTS
Patient reports living alone in an 3rd floor walk up apartment building with 1 NEVILLE. Patient state she was independent with all ADL's and functional mobility with no AD prior to admission. Patient is R hand dominant, wears reading glasses and owns a bathtub shower.

## 2021-10-25 NOTE — OCCUPATIONAL THERAPY INITIAL EVALUATION ADULT - REHAB POTENTIAL, OT EVAL
Patient demonstrated safe and independent performance of all ADL's and functional mobility with no AD and is cleared from skilled OT at this time.

## 2021-10-25 NOTE — DISCHARGE NOTE NURSING/CASE MANAGEMENT/SOCIAL WORK - NSDCFUADDAPPT_GEN_ALL_CORE_FT
Please follow up with Dr. Lazaro outpatient on 10/29/21 at 11AM. Please call the office to confirm apt at [phone]    Please follow up with your primary care doctor

## 2021-10-25 NOTE — OCCUPATIONAL THERAPY INITIAL EVALUATION ADULT - DIAGNOSIS, OT EVAL
Patient s/p fall with Large right and small left frontal brain lesions, presents with no physical or neurological deficits impacting independence with functional activities. Patient demonstrated safe and independent performance of all ADL's and functional mobility with no AD and is cleared from skilled OT at this time.

## 2021-10-25 NOTE — DISCHARGE NOTE PROVIDER - HOSPITAL COURSE
HPI:  55 y/o F with pmh asthma transferred from [facility] s/p mechanical fall found to have large right frontal cystic brain lesion and small left frontal cystic brain lesion with small area of surrounding hyperdensity suspicious for traumatic hemorrhage. Patient states that she was walking and tripped and fell on an uneven sidewalk, hitting her forehead on the pavement. Patient reports that she was not feeling dizzy or off balance prior to the fall. Patient was brought via ambulance to [facility] where CTH showed cystic brain lesions and small area of hyperdensity suspicious for hemorrhage. Stability CT 5 hours later was stable. Patient was transferred to [facility] for further workup. Patient admits to mild headache and soreness over her forehead where she hit the ground when she fell.  Patient denies LOC, history of brain lesions or hemorrhage, dizziness, confusion, diplopia, blurry vision, gait instability, and extremity numbness or weakness.    Hospital Course:  10/24: transferred from [facility], neuro intact, pending MRI brain  10/25: plan for discharge     Patient evaluated by PT/OT who recommended:  Patient is going home? rehab? hospice? Facility Name:     Hospital course uncomplicated     Exam on day of discharge:  General: patient seen laying supine in bed in NAD  Neuro: AAOx3, FC, OE spontaneously, speech clear and fluent, CNII-XI grossly intact, face symmetric, no pronator drift, finger to nose intact, strength 5/5 b/l UE and LE, sensation intact to light touch throughout  HEENT: PERRL, EOMI, VFs intact b/l  Neck: supple  Cardiac: RRR, S1S2  Pulmonary: chest rise symmetric  Abdomen: soft, nontender, nondistended  Ext: warm, perfusing well  Skin: abrasion to forehead    PatiGeneral: patient seen laying supine in bed in NAD  Neuro: AAOx3, FC, OE spontaneously, speech clear and fluent, CNII-XI grossly intact, face symmetric, no pronator drift, finger to nose intact, strength 5/5 b/l UE and LE, sensation intact to light touch throughout  HEENT: PERRL, EOMI, VFs intact b/l  Neck: supple  Cardiac: RRR, S1S2  Pulmonary: chest rise symmetric  Abdomen: soft, nontender, nondistended  Ext: warm, perfusing well  Skin: abrasion to forehead    Patient is neuro stable, vitals stable, afebrile, medically ready          HPI:  55 y/o F with pmh asthma transferred from [facility] s/p mechanical fall found to have large right frontal cystic brain lesion and small left frontal cystic brain lesion with small area of surrounding hyperdensity suspicious for traumatic hemorrhage. Patient states that she was walking and tripped and fell on an uneven sidewalk, hitting her forehead on the pavement. Patient reports that she was not feeling dizzy or off balance prior to the fall. Patient was brought via ambulance to [facility] where CTH showed cystic brain lesions and small area of hyperdensity suspicious for hemorrhage. Stability CT 5 hours later was stable. Patient was transferred to [facility] for further workup. Patient admits to mild headache and soreness over her forehead where she hit the ground when she fell.  Patient denies LOC, history of brain lesions or hemorrhage, dizziness, confusion, diplopia, blurry vision, gait instability, and extremity numbness or weakness.    Hospital Course:  10/24: transferred from [facility], neuro intact, pending MRI brain  10/25: plan for discharge     Patient evaluated by PT/OT who recommended: home with no needs     Hospital course uncomplicated     Exam on day of discharge:  General: patient seen laying supine in bed in NAD  Neuro: AAOx3, FC, OE spontaneously, speech clear and fluent, CNII-XI grossly intact, face symmetric, no pronator drift, finger to nose intact, strength 5/5 b/l UE and LE, sensation intact to light touch throughout  HEENT: PERRL, EOMI, VFs intact b/l  Neck: supple  Cardiac: RRR, S1S2  Pulmonary: chest rise symmetric  Abdomen: soft, nontender, nondistended  Ext: warm, perfusing well  Skin: abrasion to forehead    PatiGeneral: patient seen laying supine in bed in NAD  Neuro: AAOx3, FC, OE spontaneously, speech clear and fluent, CNII-XI grossly intact, face symmetric, no pronator drift, finger to nose intact, strength 5/5 b/l UE and LE, sensation intact to light touch throughout  HEENT: PERRL, EOMI, VFs intact b/l  Neck: supple  Cardiac: RRR, S1S2  Pulmonary: chest rise symmetric  Abdomen: soft, nontender, nondistended  Ext: warm, perfusing well  Skin: abrasion to forehead    Patient is neuro stable, vitals stable, afebrile, medically ready          HPI:  55 y/o F with pmh asthma transferred from [facility] s/p mechanical fall found to have large right frontal cystic brain lesion and small left frontal cystic brain lesion with small area of surrounding hyperdensity suspicious for traumatic hemorrhage. Patient states that she was walking and tripped and fell on an uneven sidewalk, hitting her forehead on the pavement. Patient reports that she was not feeling dizzy or off balance prior to the fall. Patient was brought via ambulance to [facility] where CTH showed cystic brain lesions and small area of hyperdensity suspicious for hemorrhage. Stability CT 5 hours later was stable. Patient was transferred to [facility] for further workup. Patient admits to mild headache and soreness over her forehead where she hit the ground when she fell.  Patient denies LOC, history of brain lesions or hemorrhage, dizziness, confusion, diplopia, blurry vision, gait instability, and extremity numbness or weakness.    Hospital Course:  10/24: transferred from [facility], neuro intact, pending MRI brain  10/25: plan for discharge     Patient evaluated by PT/OT who recommended: home with no needs     Hospital course uncomplicated. Patient found to have R IM DVT on admission dopplers and will follow up outpatient for a repeat doppler.     Exam on day of discharge:  General: patient seen laying supine in bed in NAD  Neuro: AAOx3, FC, OE spontaneously, speech clear and fluent, CNII-XI grossly intact, face symmetric, no pronator drift, finger to nose intact, strength 5/5 b/l UE and LE, sensation intact to light touch throughout  HEENT: PERRL, EOMI, VFs intact b/l  Neck: supple  Cardiac: RRR, S1S2  Pulmonary: chest rise symmetric  Abdomen: soft, nontender, nondistended  Ext: warm, perfusing well  Skin: abrasion to forehead    PatiGeneral: patient seen laying supine in bed in NAD  Neuro: AAOx3, FC, OE spontaneously, speech clear and fluent, CNII-XI grossly intact, face symmetric, no pronator drift, finger to nose intact, strength 5/5 b/l UE and LE, sensation intact to light touch throughout  HEENT: PERRL, EOMI, VFs intact b/l  Neck: supple  Cardiac: RRR, S1S2  Pulmonary: chest rise symmetric  Abdomen: soft, nontender, nondistended  Ext: warm, perfusing well  Skin: abrasion to forehead    Patient is neuro stable, vitals stable, afebrile, medically ready

## 2021-10-25 NOTE — OCCUPATIONAL THERAPY INITIAL EVALUATION ADULT - PERTINENT HX OF CURRENT PROBLEM, REHAB EVAL
CTH showed cystic brain lesions and small area of hyperdensity suspicious for hemorrhage. Stability CT 5 hours later was stable. P

## 2021-10-25 NOTE — DISCHARGE NOTE NURSING/CASE MANAGEMENT/SOCIAL WORK - NSDCVIVACCINE_GEN_ALL_CORE_FT
Tdap; 23-Oct-2021 16:31; Marialuisa Jay (RN); [employer]; [id number] (Exp. Date: 09-Sep-2023); IntraMuscular; Deltoid Right.; 0.5 milliLiter(s); VIS (VIS Published: 09-May-2013, VIS Presented: 23-Oct-2021);

## 2021-10-25 NOTE — OCCUPATIONAL THERAPY INITIAL EVALUATION ADULT - MD ORDER
57 y/o F with pmh asthma transferred from [facility] s/p mechanical fall found to have large right frontal cystic brain lesion and small left frontal cystic brain lesion with small area of surrounding hyperdensity suspicious for traumatic hemorrhage. Patient states that she was walking and tripped and fell on an uneven sidewalk, hitting her forehead on the pavement. Patient reports that she was not feeling dizzy or off balance prior to the fall.

## 2021-10-25 NOTE — PHYSICAL THERAPY INITIAL EVALUATION ADULT - ADDITIONAL COMMENTS
Patient reports previously independent with all ADLs/IADLs prior to admission. No HHA. Denies history of previous mechanical falls other than admitting fall (uneven sidewalk) prior to admission. Patient is (L)hand dominant and wears glasses solely for reading.

## 2021-10-25 NOTE — PHYSICAL THERAPY INITIAL EVALUATION ADULT - GAIT DEVIATIONS NOTED, PT EVAL
appropriate judi/step length, steady gait, no LOB/knee buckling noted; good negotiation through hallway obstacles without gait disturbances noted

## 2021-10-25 NOTE — PHYSICAL THERAPY INITIAL EVALUATION ADULT - PERTINENT HX OF CURRENT PROBLEM, REHAB EVAL
55 y/o F with pmh asthma transferred from [facility] s/p mechanical fall found to have large right frontal cystic brain lesion and small left frontal cystic brain lesion with small area of surrounding hyperdensity suspicious for traumatic hemorrhage. Patient states that she was walking and tripped and fell on an uneven sidewalk, hitting her forehead on the pavement. Please refer to H&P on [place] for remaining.

## 2021-10-25 NOTE — DISCHARGE NOTE NURSING/CASE MANAGEMENT/SOCIAL WORK - PATIENT PORTAL LINK FT
You can access the FollowMyHealth Patient Portal offered by [facility] by registering at the following website: [email or web address]. By joining [facility]’s FollowMyHealth portal, you will also be able to view your health information using other applications (apps) compatible with our system.

## 2021-10-25 NOTE — PROGRESS NOTE ADULT - SUBJECTIVE AND OBJECTIVE BOX
HPI:   55 y/o F with pmh asthma transferred from [facility] s/p mechanical fall found to have large right frontal cystic brain lesion and small left frontal cystic brain lesion with small area of surrounding hyperdensity suspicious for traumatic hemorrhage. Patient states that she was walking and tripped and fell on an uneven sidewalk, hitting her forehead on the pavement. Patient reports that she was not feeling dizzy or off balance prior to the fall. Patient was brought via ambulance to [facility] where CTH showed cystic brain lesions and small area of hyperdensity suspicious for hemorrhage. Stability CT 5 hours later was stable. Patient was transferred to [facility] for further workup. Patient admits to mild headache and soreness over her forehead where she hit the ground when she fell.  Patient denies LOC, history of brain lesions or hemorrhage, dizziness, confusion, diplopia, blurry vision, gait instability, and extremity numbness or weakness.    Hospital course:   10/24: transferred from [facility], neuro intact, pending MRI brain  10/25:     Vital Signs Last 24 Hrs  T(C): 36.8 (24 Oct 2021 20:04), Max: 37.4 (24 Oct 2021 18:48)  T(F): 98.3 (24 Oct 2021 20:04), Max: 99.3 (24 Oct 2021 18:48)  HR: 89 (24 Oct 2021 20:04) (68 - 89)  BP: 103/61 (24 Oct 2021 20:04) (103/61 - 115/65)  BP(mean): --  RR: 16 (24 Oct 2021 20:04) (15 - 18)  SpO2: 98% (24 Oct 2021 20:04) (97% - 100%)    I&O's Detail    23 Oct 2021 07:01  -  24 Oct 2021 07:00  --------------------------------------------------------  IN:  Total IN: 0 mL    OUT:    Voided (mL): 300 mL  Total OUT: 300 mL    Total NET: -300 mL    24 Oct 2021 07:01  -  25 Oct 2021 02:32  --------------------------------------------------------  IN:    Oral Fluid: 720 mL  Total IN: 720 mL    OUT:    Voided (mL): 550 mL  Total OUT: 550 mL    Total NET: 170 mL    I&O's Summary    23 Oct 2021 07:01  -  24 Oct 2021 07:00  --------------------------------------------------------  IN: 0 mL / OUT: 300 mL / NET: -300 mL    24 Oct 2021 07:01  -  25 Oct 2021 02:32  --------------------------------------------------------  IN: 720 mL / OUT: 550 mL / NET: 170 mL    PHYSICAL EXAM:  General: patient seen laying supine in bed in NAD  Neuro: AAOx3, FC, OE spontaneously, speech clear and fluent, CNII-XI grossly intact, face symmetric, no pronator drift, finger to nose intact, strength 5/5 b/l UE and LE, sensation intact to light touch throughout  HEENT: PERRL, EOMI, VFs intact b/l  Neck: supple  Cardiac: RRR, S1S2  Pulmonary: chest rise symmetric  Abdomen: soft, nontender, nondistended  Ext: warm, perfusing well  Skin: abrasion to forehead    LABS:                        12.6   4.65  )-----------( 251      ( 24 Oct 2021 07:15 )             38.2     10-24    142  |  106  |  9   ----------------------------<  96  3.5   |  28  |  0.73    Ca    9.3      24 Oct 2021 07:15  Phos  3.7     10-24  Mg     2.0     10-24    TPro  7.2  /  Alb  4.3  /  TBili  0.9  /  DBili  x   /  AST  26  /  ALT  19  /  AlkPhos  80  10-24    PT/INR - ( 23 Oct 2021 19:51 )   PT: 12.4 sec;   INR: 1.05        PTT - ( 23 Oct 2021 19:51 )  PTT:26.6 sec    CAPILLARY BLOOD GLUCOSE    Drug Levels: [] N/A    CSF Analysis: [] N/A    Allergies    No Known Allergies    Intolerances    MEDICATIONS:  Antibiotics:    Neuro:  acetaminophen     Tablet .. 650 milliGRAM(s) Oral every 6 hours PRN  ondansetron Injectable 4 milliGRAM(s) IV Push every 6 hours PRN    Anticoagulation:    OTHER:  ALBUTerol    0.083% 2.5 milliGRAM(s) Nebulizer every 6 hours PRN  influenza   Vaccine 0.5 milliLiter(s) IntraMuscular once  senna 2 Tablet(s) Oral at bedtime PRN    IVF:    CULTURES:    RADIOLOGY & ADDITIONAL TESTS:  RADIOLOGY, EKG & ADDITIONAL TESTS: Reviewed.  < from: CT Head No Cont (10.23.21 @ 16:45) >    IMPRESSION:    Right frontal lobe cystic lesion measuring 4.1 x 2.4 cm with associated mass effect, nonspecific finding, which may represent neuroepithelial cyst or large perivascular space, among other etiology. Dedicated MRI brain with and without contrast may be obtained for further assessment.    Left frontal lobe subcentimeter cyst with surrounding crescentic hyperdensity, possibilityof hemorrhage in the setting of acute trauma cannot be excluded. Short-term follow-up imaging is recommended.    < end of copied text >    Stability CT head 10/23: stable lesions and area of hyperdensity  CT cervical spine 10/23: wet read negative for fracture or areas of instability    < from: MR Head w/wo IV Cont (10.24.21 @ 14:58) >  IMPRESSION:    3.5 cm cystic lesion in the right frontal lobe corresponding to findings on recent CT head. No evidence of abnormal enhancement. This finding may represent neuroepithelial cyst and follow-up MRI brain in 6-12 months is recommended    Heterogeneous nonenhancing lesion in the left frontal lobe which is most compatible with cavernoma corresponding to findings on recent CT head. No evidence of acute infarction, hemorrhage or extra-axial fluid collection.    < end of copied text >      ASSESSMENT:  55 y/o F with pmh asthma transferred from [facility] s/p mechanical trip and fall found to have large right frontal cystic brain lesion and small left frontal cystic brain lesion with small area of surrounding hyperdensity suspicious for traumatic hemorrhage.     Neuro:  - neuro/vital checks q4  - pain control  - repeat CT 10/23 stable  - CT c-spine 10/23, f/u final read  - MRI brain w/ w/o contrast pending    Cardiac:  - normotensive BP goal  - pending baseline EKG    Pulmonary:  - albuterol nebulizer prn  - on RA    GI:  - regular diet  - bowel regimen  - LFTs elevated on admission, now normalized     Renal:  - replete electolytes prn    Heme:  - SCDs for DVT ppx  - pending baseline LE dopplers    Endo:  - no issues    ID:  - afebrile    D/w Dr. Lazaro

## 2021-10-25 NOTE — OCCUPATIONAL THERAPY INITIAL EVALUATION ADULT - GENERAL OBSERVATIONS, REHAB EVAL
Patient received semisupine in bed +tele, +open wound on forehead, +heplock IV, room air, NAD. Patient A&Ox4, agreeable and tolerated session well.

## 2021-10-25 NOTE — DISCHARGE NOTE PROVIDER - NSDCFUADDAPPT_GEN_ALL_CORE_FT
Please follow up with Dr. Lazaro    Please follow up with your primary care doctor Please follow up with Dr. Lazaro outpatient. Please call the office to make an apt at [phone]    Please follow up with your primary care doctor Please follow up with Dr. Lazaro outpatient on 10/29/21 at 11AM. Please call the office to confirm apt at [phone]    Please follow up with your primary care doctor Please follow up with Dr. Lazaro outpatient on 10/29/21 at 11AM. Please call the office to confirm apt at [phone]    Please follow up with your primary care doctor. You will need a repeat doppler in 1 week to monitor your right intramuscular deep venous thrombosis  Please follow up with Dr. Lazaro outpatient on 10/29/21 at 11AM. Please call the office to confirm apt at [phone]. You will need a repeat MRI in 3 months    Please follow up with your primary care doctor. You will need a repeat doppler in 1 week to monitor your right intramuscular deep venous thrombosis

## 2021-10-25 NOTE — DISCHARGE NOTE PROVIDER - NSDCMRMEDTOKEN_GEN_ALL_CORE_FT
acetaminophen 325 mg oral tablet: 2 tab(s) orally every 6 hours, As needed, Temp greater or equal to 38.5C (101.3F), Mild Pain (1 - 3)  albuterol 90 mcg/inh inhalation aerosol:  As Needed

## 2021-10-25 NOTE — PHYSICAL THERAPY INITIAL EVALUATION ADULT - SENSORY TESTS
(L) hand dominant; (L) hand  5/5, (R) hand  5/5. CN Testing: B/L Frontalis intact; B/L buccinator intact; smile symmetrical; tongue protrusion at midline; B/L eyes open/close intact; Shoulder elevation: intact bilaterally; Vision H-Test: bilateral tracking and smooth pursuit intact; Convergence/Divergence: intact; Vision Quadrant Test: intact bilaterally. Rapid alternating movements: N/T

## 2021-10-26 PROBLEM — J45.909 UNSPECIFIED ASTHMA, UNCOMPLICATED: Chronic | Status: ACTIVE | Noted: 2021-10-23

## 2021-10-28 ENCOUNTER — NON-APPOINTMENT (OUTPATIENT)
Age: 56
End: 2021-10-28

## 2021-10-28 PROBLEM — Q28.3 CAVERNOUS MALFORMATION: Status: ACTIVE | Noted: 2021-10-28

## 2021-10-28 PROBLEM — I82.409 DVT (DEEP VENOUS THROMBOSIS): Status: ACTIVE | Noted: 2021-10-28

## 2021-10-28 PROBLEM — Z00.00 ENCOUNTER FOR PREVENTIVE HEALTH EXAMINATION: Status: ACTIVE | Noted: 2021-10-28

## 2021-10-28 PROBLEM — G93.0 CEREBRAL CYST: Status: ACTIVE | Noted: 2021-10-28

## 2021-10-28 PROBLEM — Z87.09 HISTORY OF ASTHMA: Status: RESOLVED | Noted: 2021-10-28 | Resolved: 2021-10-28

## 2021-10-28 RX ORDER — ALBUTEROL 90 MCG
90 AEROSOL (GRAM) INHALATION
Refills: 0 | Status: ACTIVE | COMMUNITY

## 2021-10-29 ENCOUNTER — APPOINTMENT (OUTPATIENT)
Dept: NEUROSURGERY | Facility: CLINIC | Age: 56
End: 2021-10-29

## 2021-10-29 DIAGNOSIS — Z87.09 PERSONAL HISTORY OF OTHER DISEASES OF THE RESPIRATORY SYSTEM: ICD-10-CM

## 2021-10-29 DIAGNOSIS — I82.409 ACUTE EMBOLISM AND THROMBOSIS OF UNSPECIFIED DEEP VEINS OF UNSPECIFIED LOWER EXTREMITY: ICD-10-CM

## 2021-10-29 DIAGNOSIS — Q28.3 OTHER MALFORMATIONS OF CEREBRAL VESSELS: ICD-10-CM

## 2021-10-29 DIAGNOSIS — G93.0 CEREBRAL CYSTS: ICD-10-CM

## 2021-11-12 ENCOUNTER — APPOINTMENT (OUTPATIENT)
Dept: NEUROSURGERY | Facility: CLINIC | Age: 56
End: 2021-11-12

## 2023-02-15 NOTE — ED ADULT NURSE NOTE - NSFALLRSKHARMRISK_ED_ALL_ED
Patient's daughter presented at the  it should be intermittent from today's start appointment 3/6/23.   Please refax corrected paperwork.  thanks no

## 2023-12-04 ENCOUNTER — EMERGENCY (EMERGENCY)
Facility: HOSPITAL | Age: 58
LOS: 1 days | Discharge: HOME | End: 2023-12-04
Attending: EMERGENCY MEDICINE | Admitting: EMERGENCY MEDICINE
Payer: MEDICAID

## 2023-12-04 VITALS
TEMPERATURE: 98 F | WEIGHT: 149.91 LBS | DIASTOLIC BLOOD PRESSURE: 94 MMHG | RESPIRATION RATE: 26 BRPM | HEIGHT: 65 IN | SYSTOLIC BLOOD PRESSURE: 131 MMHG | HEART RATE: 84 BPM | OXYGEN SATURATION: 95 %

## 2023-12-04 VITALS
HEART RATE: 83 BPM | SYSTOLIC BLOOD PRESSURE: 115 MMHG | DIASTOLIC BLOOD PRESSURE: 75 MMHG | OXYGEN SATURATION: 98 % | TEMPERATURE: 98 F | RESPIRATION RATE: 18 BRPM

## 2023-12-04 DIAGNOSIS — Z20.822 CONTACT WITH AND (SUSPECTED) EXPOSURE TO COVID-19: ICD-10-CM

## 2023-12-04 DIAGNOSIS — Z98.890 OTHER SPECIFIED POSTPROCEDURAL STATES: Chronic | ICD-10-CM

## 2023-12-04 DIAGNOSIS — J45.901 UNSPECIFIED ASTHMA WITH (ACUTE) EXACERBATION: ICD-10-CM

## 2023-12-04 DIAGNOSIS — R06.02 SHORTNESS OF BREATH: ICD-10-CM

## 2023-12-04 LAB
FLUAV AG NPH QL: SIGNIFICANT CHANGE UP
FLUAV AG NPH QL: SIGNIFICANT CHANGE UP
FLUBV AG NPH QL: SIGNIFICANT CHANGE UP
FLUBV AG NPH QL: SIGNIFICANT CHANGE UP
RSV RNA NPH QL NAA+NON-PROBE: SIGNIFICANT CHANGE UP
RSV RNA NPH QL NAA+NON-PROBE: SIGNIFICANT CHANGE UP
SARS-COV-2 RNA SPEC QL NAA+PROBE: SIGNIFICANT CHANGE UP
SARS-COV-2 RNA SPEC QL NAA+PROBE: SIGNIFICANT CHANGE UP

## 2023-12-04 PROCEDURE — 71046 X-RAY EXAM CHEST 2 VIEWS: CPT | Mod: 26

## 2023-12-04 PROCEDURE — 99284 EMERGENCY DEPT VISIT MOD MDM: CPT

## 2023-12-04 RX ORDER — IPRATROPIUM/ALBUTEROL SULFATE 18-103MCG
3 AEROSOL WITH ADAPTER (GRAM) INHALATION ONCE
Refills: 0 | Status: COMPLETED | OUTPATIENT
Start: 2023-12-04 | End: 2023-12-04

## 2023-12-04 RX ORDER — ALBUTEROL 90 UG/1
0.5 AEROSOL, METERED ORAL
Qty: 120 | Refills: 0
Start: 2023-12-04 | End: 2024-01-02

## 2023-12-04 RX ORDER — ALBUTEROL 90 UG/1
2 AEROSOL, METERED ORAL ONCE
Refills: 0 | Status: COMPLETED | OUTPATIENT
Start: 2023-12-04 | End: 2023-12-04

## 2023-12-04 RX ORDER — DEXAMETHASONE 0.5 MG/5ML
6 ELIXIR ORAL ONCE
Refills: 0 | Status: COMPLETED | OUTPATIENT
Start: 2023-12-04 | End: 2023-12-04

## 2023-12-04 RX ORDER — ALBUTEROL 90 UG/1
2.5 AEROSOL, METERED ORAL ONCE
Refills: 0 | Status: COMPLETED | OUTPATIENT
Start: 2023-12-04 | End: 2023-12-04

## 2023-12-04 RX ADMIN — Medication 3 MILLILITER(S): at 18:26

## 2023-12-04 RX ADMIN — Medication 3 MILLILITER(S): at 18:42

## 2023-12-04 RX ADMIN — Medication 6 MILLIGRAM(S): at 18:26

## 2023-12-04 RX ADMIN — ALBUTEROL 2.5 MILLIGRAM(S): 90 AEROSOL, METERED ORAL at 20:44

## 2023-12-04 RX ADMIN — ALBUTEROL 2 PUFF(S): 90 AEROSOL, METERED ORAL at 20:45

## 2023-12-04 RX ADMIN — Medication 3 MILLILITER(S): at 18:43

## 2023-12-04 NOTE — ED ADULT TRIAGE NOTE - CHIEF COMPLAINT QUOTE
Pt walked in with c/o asthma exacerbation x1 day. No relief from inhaler. Speaking in short sentences with audible wheezing. Denies Hx intubation but has been on BiPAP in the past.

## 2023-12-04 NOTE — ED PROVIDER NOTE - NSDCPRINTRESULTS_ED_ALL_ED
Detail Level: Simple Detail Level: Generalized Detail Level: Detailed Patient requests all Lab, Cardiology, and Radiology Results on their Discharge Instructions

## 2023-12-04 NOTE — ED PROVIDER NOTE - CLINICAL SUMMARY MEDICAL DECISION MAKING FREE TEXT BOX
will treat for mild/moderate asthma exacerbation w nebs/steroid dose. At this time pt refusing other therapeutic options like magnesium or terbutaline. will reassess after nebs and steroid dose.

## 2023-12-04 NOTE — ED PROVIDER NOTE - OBJECTIVE STATEMENT
59 yo female pt, hx of asthma, no maintenance meds, no prior intubations. Presents for 1 day progression of sob/wheezing since yesterday. dry cough, no fever, no chills, no abd pain, no n/v/d, no chest pain. Has had similar exacerbations in the past.

## 2023-12-04 NOTE — ED ADULT NURSE NOTE - NSFALLUNIVINTERV_ED_ALL_ED
Bed/Stretcher in lowest position, wheels locked, appropriate side rails in place/Call bell, personal items and telephone in reach/Instruct patient to call for assistance before getting out of bed/chair/stretcher/Non-slip footwear applied when patient is off stretcher/[place] to call system/Physically safe environment - no spills, clutter or unnecessary equipment/Purposeful proactive rounding/Room/bathroom lighting operational, light cord in reach Bed/Stretcher in lowest position, wheels locked, appropriate side rails in place/Call bell, personal items and telephone in reach/Instruct patient to call for assistance before getting out of bed/chair/stretcher/Non-slip footwear applied when patient is off stretcher/[place] to call system/Physically safe environment - no spills, clutter or unnecessary equipment/Purposeful proactive rounding/Room/bathroom lighting operational, light cord in reach

## 2023-12-04 NOTE — ED PROVIDER NOTE - PATIENT PORTAL LINK FT
You can access the FollowMyHealth Patient Portal offered by [facility] by registering at the following website: [email or web address]. By joining [employer]s FollowMyHealth portal, you will also be able to view your health information using other applications (apps) compatible with our system. You can access the FollowMyHealth Patient Portal offered by [facility] by registering at the following website: [email or web address]. By joining [employer]’s FollowMyHealth portal, you will also be able to view your health information using other applications (apps) compatible with our system.

## 2023-12-04 NOTE — ED ADULT NURSE NOTE - OBJECTIVE STATEMENT
Pt reports sob, asthma exacerbation worsening since Saturday. Patient reports she ran out of her douneSteelHouse at home. Pt reports sob, asthma exacerbation worsening since Saturday. Patient reports she ran out of her douneOpenSesame at home.

## 2024-02-22 NOTE — ED ADULT NURSE NOTE - NS PRO PASSIVE SMOKE EXP
Mood and Affect: Mood normal.         Behavior: Behavior normal.     No results found for this visit on 02/22/24.    Assessent & Plan    63 year old in for cpe  Check labs    HCM Colonoscopy : 2016 - due 2026 Mammogram 2023 feb Pap S/P [facility] CPE  6 months  Vaccines  due   Counseling/Anticipatory Guidance:  nutrition, family planning/contraception, physical activity, healthy weight, injury prevention, misuse of tobacco, alcohol and drugs, sexual behavior and STDs, dental health, mental health, immunizations, screenings     Breast cancer and self breast exams      The patient and/or patient representative voiced understanding and agreement with the current diagnoses, recommendations, and possible side effects.    No follow-up provider specified.      Brooke Shafer MD  Advance Care Planning   Discussed the patient’s choices for care and treatment preferences in case of a health event that adversely affects decision-making abilities or is life-limiting. Recommended the patient document care preferences in state-specific advance directives. Also reviewed the process of designating a trusted capable adult as an Agent (or Health Care Power of ) to make health care decisions for the patient if the patient becomes unable due to incapacity. Patient was asked to complete advance directive forms, if not already done, and to provide a dated, signed and witnessed (or notarized) copy, per the forms' requirements, to the practice office.       
No

## 2025-03-25 NOTE — ED PROVIDER NOTE - IV ALTEPLASE DOOR HIDDEN
Ok in one year. I generally will check lipid unfasting at times depending on what the levels are   show